# Patient Record
Sex: FEMALE | Race: OTHER | HISPANIC OR LATINO | URBAN - METROPOLITAN AREA
[De-identification: names, ages, dates, MRNs, and addresses within clinical notes are randomized per-mention and may not be internally consistent; named-entity substitution may affect disease eponyms.]

---

## 2019-10-19 ENCOUNTER — EMERGENCY (EMERGENCY)
Facility: HOSPITAL | Age: 59
LOS: 1 days | Discharge: HOME | End: 2019-10-19
Attending: STUDENT IN AN ORGANIZED HEALTH CARE EDUCATION/TRAINING PROGRAM
Payer: MEDICARE

## 2019-10-19 VITALS
RESPIRATION RATE: 18 BRPM | SYSTOLIC BLOOD PRESSURE: 119 MMHG | HEART RATE: 74 BPM | DIASTOLIC BLOOD PRESSURE: 81 MMHG | OXYGEN SATURATION: 100 %

## 2019-10-19 VITALS
HEART RATE: 82 BPM | RESPIRATION RATE: 18 BRPM | HEIGHT: 59 IN | DIASTOLIC BLOOD PRESSURE: 86 MMHG | TEMPERATURE: 99 F | WEIGHT: 115.96 LBS | OXYGEN SATURATION: 98 % | SYSTOLIC BLOOD PRESSURE: 122 MMHG

## 2019-10-19 LAB
ALBUMIN SERPL ELPH-MCNC: 4.3 G/DL — SIGNIFICANT CHANGE UP (ref 3.3–5.2)
ALP SERPL-CCNC: 124 U/L — HIGH (ref 40–120)
ALT FLD-CCNC: 27 U/L — SIGNIFICANT CHANGE UP
ANION GAP SERPL CALC-SCNC: 14 MMOL/L — SIGNIFICANT CHANGE UP (ref 5–17)
AST SERPL-CCNC: 21 U/L — SIGNIFICANT CHANGE UP
BASOPHILS # BLD AUTO: 0.06 K/UL — SIGNIFICANT CHANGE UP (ref 0–0.2)
BASOPHILS NFR BLD AUTO: 0.7 % — SIGNIFICANT CHANGE UP (ref 0–2)
BILIRUB SERPL-MCNC: 0.3 MG/DL — LOW (ref 0.4–2)
BUN SERPL-MCNC: 11 MG/DL — SIGNIFICANT CHANGE UP (ref 8–20)
CALCIUM SERPL-MCNC: 9.1 MG/DL — SIGNIFICANT CHANGE UP (ref 8.6–10.2)
CHLORIDE SERPL-SCNC: 103 MMOL/L — SIGNIFICANT CHANGE UP (ref 98–107)
CO2 SERPL-SCNC: 23 MMOL/L — SIGNIFICANT CHANGE UP (ref 22–29)
CREAT SERPL-MCNC: 0.77 MG/DL — SIGNIFICANT CHANGE UP (ref 0.5–1.3)
EOSINOPHIL # BLD AUTO: 0.23 K/UL — SIGNIFICANT CHANGE UP (ref 0–0.5)
EOSINOPHIL NFR BLD AUTO: 2.5 % — SIGNIFICANT CHANGE UP (ref 0–6)
GLUCOSE SERPL-MCNC: 165 MG/DL — HIGH (ref 70–115)
HCT VFR BLD CALC: 40.6 % — SIGNIFICANT CHANGE UP (ref 34.5–45)
HGB BLD-MCNC: 13.2 G/DL — SIGNIFICANT CHANGE UP (ref 11.5–15.5)
IMM GRANULOCYTES NFR BLD AUTO: 0.4 % — SIGNIFICANT CHANGE UP (ref 0–1.5)
LYMPHOCYTES # BLD AUTO: 0.91 K/UL — LOW (ref 1–3.3)
LYMPHOCYTES # BLD AUTO: 10 % — LOW (ref 13–44)
MCHC RBC-ENTMCNC: 29.4 PG — SIGNIFICANT CHANGE UP (ref 27–34)
MCHC RBC-ENTMCNC: 32.5 GM/DL — SIGNIFICANT CHANGE UP (ref 32–36)
MCV RBC AUTO: 90.4 FL — SIGNIFICANT CHANGE UP (ref 80–100)
MONOCYTES # BLD AUTO: 0.66 K/UL — SIGNIFICANT CHANGE UP (ref 0–0.9)
MONOCYTES NFR BLD AUTO: 7.3 % — SIGNIFICANT CHANGE UP (ref 2–14)
NEUTROPHILS # BLD AUTO: 7.18 K/UL — SIGNIFICANT CHANGE UP (ref 1.8–7.4)
NEUTROPHILS NFR BLD AUTO: 79.1 % — HIGH (ref 43–77)
PLATELET # BLD AUTO: 220 K/UL — SIGNIFICANT CHANGE UP (ref 150–400)
POTASSIUM SERPL-MCNC: 4 MMOL/L — SIGNIFICANT CHANGE UP (ref 3.5–5.3)
POTASSIUM SERPL-SCNC: 4 MMOL/L — SIGNIFICANT CHANGE UP (ref 3.5–5.3)
PROT SERPL-MCNC: 7.3 G/DL — SIGNIFICANT CHANGE UP (ref 6.6–8.7)
RBC # BLD: 4.49 M/UL — SIGNIFICANT CHANGE UP (ref 3.8–5.2)
RBC # FLD: 12.7 % — SIGNIFICANT CHANGE UP (ref 10.3–14.5)
SODIUM SERPL-SCNC: 140 MMOL/L — SIGNIFICANT CHANGE UP (ref 135–145)
TROPONIN T SERPL-MCNC: <0.01 NG/ML — SIGNIFICANT CHANGE UP (ref 0–0.06)
WBC # BLD: 9.08 K/UL — SIGNIFICANT CHANGE UP (ref 3.8–10.5)
WBC # FLD AUTO: 9.08 K/UL — SIGNIFICANT CHANGE UP (ref 3.8–10.5)

## 2019-10-19 PROCEDURE — 99284 EMERGENCY DEPT VISIT MOD MDM: CPT | Mod: 25

## 2019-10-19 PROCEDURE — 71046 X-RAY EXAM CHEST 2 VIEWS: CPT | Mod: 26

## 2019-10-19 PROCEDURE — 94640 AIRWAY INHALATION TREATMENT: CPT

## 2019-10-19 PROCEDURE — 85027 COMPLETE CBC AUTOMATED: CPT

## 2019-10-19 PROCEDURE — 99284 EMERGENCY DEPT VISIT MOD MDM: CPT

## 2019-10-19 PROCEDURE — 84484 ASSAY OF TROPONIN QUANT: CPT

## 2019-10-19 PROCEDURE — 71046 X-RAY EXAM CHEST 2 VIEWS: CPT

## 2019-10-19 PROCEDURE — 93010 ELECTROCARDIOGRAM REPORT: CPT

## 2019-10-19 PROCEDURE — 93005 ELECTROCARDIOGRAM TRACING: CPT

## 2019-10-19 PROCEDURE — 80053 COMPREHEN METABOLIC PANEL: CPT

## 2019-10-19 PROCEDURE — 36415 COLL VENOUS BLD VENIPUNCTURE: CPT

## 2019-10-19 RX ORDER — AZITHROMYCIN 500 MG/1
1 TABLET, FILM COATED ORAL
Qty: 1 | Refills: 0
Start: 2019-10-19 | End: 2019-10-23

## 2019-10-19 RX ORDER — IPRATROPIUM/ALBUTEROL SULFATE 18-103MCG
3 AEROSOL WITH ADAPTER (GRAM) INHALATION ONCE
Refills: 0 | Status: COMPLETED | OUTPATIENT
Start: 2019-10-19 | End: 2019-10-19

## 2019-10-19 RX ORDER — FAMOTIDINE 10 MG/ML
20 INJECTION INTRAVENOUS ONCE
Refills: 0 | Status: COMPLETED | OUTPATIENT
Start: 2019-10-19 | End: 2019-10-19

## 2019-10-19 RX ADMIN — Medication 3 MILLILITER(S): at 10:26

## 2019-10-19 RX ADMIN — FAMOTIDINE 20 MILLIGRAM(S): 10 INJECTION INTRAVENOUS at 10:27

## 2019-10-19 RX ADMIN — Medication 3 MILLILITER(S): at 10:27

## 2019-10-19 RX ADMIN — Medication 60 MILLIGRAM(S): at 10:28

## 2019-10-19 NOTE — ED ADULT NURSE NOTE - NSIMPLEMENTINTERV_GEN_ALL_ED
Implemented All Universal Safety Interventions:  [place] to call system. Call bell, personal items and telephone within reach. Instruct patient to call for assistance. Room bathroom lighting operational. Non-slip footwear when patient is off stretcher. Physically safe environment: no spills, clutter or unnecessary equipment. Stretcher in lowest position, wheels locked, appropriate side rails in place.

## 2019-10-19 NOTE — ED PROVIDER NOTE - CLINICAL SUMMARY MEDICAL DECISION MAKING FREE TEXT BOX
57 y/o female With 3-4 days of cough with green sputum , chest congestion , and body ache and chest tightness  cbc ,cmp , trop, CXR, duoneb x 2 , prednisone, pepcid  , re eval

## 2019-10-19 NOTE — ED PROVIDER NOTE - ATTENDING CONTRIBUTION TO CARE
I performed a face to face history and physical exam of the patient and discussed their management with the resident/ACP. I reviewed the resident/ACP's note and agree with the documented findings and plan of care.    Pt with two days of cough, chest congestion.  no other complaints.    physical - rrr. ctab. abd - soft, nt. no edema. no rash.    plan - labs and imaging reviewed. Pt likely with uri/bronchitis. will treat with meds, d/c with outpatient f/up.

## 2019-10-19 NOTE — ED PROVIDER NOTE - PATIENT PORTAL LINK FT
You can access the FollowMyHealth Patient Portal offered by [facility] by registering at the following website: [email or web address]. By joining [employer]’s FollowMyHealth portal, you will also be able to view your health information using other applications (apps) compatible with our system.

## 2019-10-19 NOTE — ED ADULT NURSE NOTE - OBJECTIVE STATEMENT
58 year old female in no acute distress, alert and oriented x 4, c/o cough productive of green phlegm and tightness with breathing not relieved by asthma inhaler. Pt flew from [place] last Sunday, no known sick contacts, denies fever, states "I feel like I have bronchitis". Pt denies CP states "I feel tightness". Physical exam deferred to physician/acp at this time.

## 2019-10-19 NOTE — ED ADULT TRIAGE NOTE - CHIEF COMPLAINT QUOTE
Pt with hx of recurrent bronchitis/asthma 3 days of worsening chest tightness, green mucus that is pink tinged, cough and loss of voice. Pt took IBU 30min PTA.

## 2019-10-19 NOTE — ED PROVIDER NOTE - OBJECTIVE STATEMENT
57 y/o female PMh of asthma ,DM II and HTN on med present in ER and c.O chest tightness , body ache and congestion since 3-4 days ago , states she was coughing with green and tinge blood sputum in it that she was taking her albuterol machine was not helping , states she came from [place] to seen the mother a week ago, she took ibuprofen this morning for her aches and she felt that she has horse voice x 2 dyas ago .   denies any hx of CAD , denies any hx of DVT PE , fever , night sweat or recent hospitalization , smoking , or leg swollen   Hx of hy 57 y/o female PMh of asthma ,DM II and HTN on med present in ER and c.O chest tightness , body ache and congestion since 3-4 days ago , states she was coughing with green and tinge blood sputum in it that she was taking her albuterol machine was not helping , states she came from [place] to seen the mother a week ago, she took ibuprofen this morning for her aches and she felt that she has horse voice x 2 dyas ago .   denies any hx of CAD , denies any hx of DVT PE , fever , night sweat or recent hospitalization , smoking , or leg swollen   Hx of hysterectomy at age 37

## 2019-10-19 NOTE — ED PROVIDER NOTE - CARE PLAN
Principal Discharge DX:	Moderate asthma, unspecified whether complicated, unspecified whether persistent

## 2019-10-19 NOTE — ED PROVIDER NOTE - ENMT, MLM
Airway patent. pharynx with mild erythema S.p tonsilotomy . Throat has no vesicles, no oropharyngeal exudates and uvula is midline.

## 2019-10-19 NOTE — ED PROVIDER NOTE - CONSTITUTIONAL, MLM
normal... Well appearing, well nourished, awake, alert, oriented to person, place, time/situation and in no apparent distress. not grasping air ,

## 2019-10-19 NOTE — ED PROVIDER NOTE - PROGRESS NOTE DETAILS
pt states she feels better after tx pt states she feels better after tx, reexamine No wheezing on breathing or chest congestion, states she goes back [place] tomorrow , CXR negative will tx as bronchitis with z pack , states she dose not need albuterol spray and she has neb solution and machine in [place]

## 2023-10-18 NOTE — ED ADULT TRIAGE NOTE - BSA (M2)
Call placed to Patient to schedule an appointment. Offered appointment for 10/25/2023 at 1520 hrs. Patient requested early in the day. Agreeable to 11/09/2023 at 9:20 AM.  
STAT Referral    Recurrent epistaxis [R04.0]  
1.46

## 2025-02-24 NOTE — ED PROVIDER NOTE - NS ED MD EM SELECTION
Care Transitions Note    Readmission Note      Patient closed (current admission to an outside facility) from the Care Transitions program on 2/24/25.      Handoff:   Patient was not referred to the ACM team due to  current admission.      Care Summary Note: Patient was readmitted to [facility] on 2/22/25 for syncope and remains admitted currently.      Upcoming Appointments:    Future Appointments         Provider Specialty Dept Phone    2/26/2025 12:00 PM (Arrive by 10:30 AM) [facility] RM 1 Radiology [phone]    2/28/2025 1:30 PM Maxime Killian MD Family Medicine [phone]    4/21/2025 9:00 AM Maxime Killian MD Family Medicine [phone]    11/17/2025 9:30 AM Maxime Killian MD Family Medicine [phone]            Larissa Coleman RN    [id number] Detailed